# Patient Record
Sex: MALE | Race: OTHER | HISPANIC OR LATINO
[De-identification: names, ages, dates, MRNs, and addresses within clinical notes are randomized per-mention and may not be internally consistent; named-entity substitution may affect disease eponyms.]

---

## 2017-01-10 ENCOUNTER — RECORD ABSTRACTING (OUTPATIENT)
Age: 47
End: 2017-01-10

## 2017-01-10 DIAGNOSIS — Z80.6 FAMILY HISTORY OF LEUKEMIA: ICD-10-CM

## 2017-01-10 DIAGNOSIS — Z87.898 PERSONAL HISTORY OF OTHER SPECIFIED CONDITIONS: ICD-10-CM

## 2017-01-10 DIAGNOSIS — Z92.89 PERSONAL HISTORY OF OTHER MEDICAL TREATMENT: ICD-10-CM

## 2017-01-10 DIAGNOSIS — Z78.9 OTHER SPECIFIED HEALTH STATUS: ICD-10-CM

## 2017-01-10 DIAGNOSIS — I86.1 SCROTAL VARICES: ICD-10-CM

## 2017-03-25 ENCOUNTER — RECORD ABSTRACTING (OUTPATIENT)
Age: 47
End: 2017-03-25

## 2017-03-29 ENCOUNTER — APPOINTMENT (OUTPATIENT)
Dept: INTERNAL MEDICINE | Facility: HOSPITAL | Age: 47
End: 2017-03-29

## 2017-04-19 DIAGNOSIS — N43.3 HYDROCELE, UNSPECIFIED: ICD-10-CM

## 2017-04-19 DIAGNOSIS — Z87.19 PERSONAL HISTORY OF OTHER DISEASES OF THE DIGESTIVE SYSTEM: ICD-10-CM

## 2017-05-02 ENCOUNTER — APPOINTMENT (OUTPATIENT)
Dept: INTERNAL MEDICINE | Facility: HOSPITAL | Age: 47
End: 2017-05-02

## 2017-05-02 VITALS
BODY MASS INDEX: 27.48 KG/M2 | TEMPERATURE: 99.4 F | SYSTOLIC BLOOD PRESSURE: 110 MMHG | DIASTOLIC BLOOD PRESSURE: 80 MMHG | HEART RATE: 64 BPM | WEIGHT: 171 LBS | HEIGHT: 66 IN

## 2017-05-02 LAB
ALBUMIN SERPL-MCNC: 4.8 G/DL
ALBUMIN/GLOB SERPL: 2.67
ALP SERPL-CCNC: 74 IU/L
ALT SERPL-CCNC: 24 IU/L
ANION GAP SERPL CALC-SCNC: 8 MMOL/L
AST SERPL-CCNC: 19 IU/L
BASOPHILS # BLD: 0.01 TH/MM3
BASOPHILS NFR BLD: 0.2 %
BILIRUB SERPL-MCNC: 0.6 MG/DL
BUN SERPL-MCNC: 19 MG/DL
BUN/CREAT SERPL: 25.7 %
CALCIUM SERPL-MCNC: 9.3 MG/DL
CHLORIDE SERPL-SCNC: 109 MMOL/L
CHOLEST SERPL-MCNC: 240 MG/DL
CO2 SERPL-SCNC: 24 MMOL/L
CREAT SERPL-MCNC: 0.74 MG/DL
EOSINOPHIL # BLD: 0.04 TH/MM3
EOSINOPHIL NFR BLD: 0.6 %
ERYTHROCYTE [DISTWIDTH] IN BLOOD BY AUTOMATED COUNT: 12.5 %
GFR SERPL CREATININE-BSD FRML MDRD: 113
GLUCOSE SERPL-MCNC: 96 MG/DL
GRANULOCYTES # BLD: 3.93 TH/MM3
GRANULOCYTES NFR BLD: 60.7 %
HCT VFR BLD AUTO: 41.7 %
HDLC SERPL-MCNC: 52 MG/DL
HDLC SERPL: 4.6
HGB BLD-MCNC: 14.8 G/DL
IMM GRANULOCYTES # BLD: 0.01 TH/MM3
IMM GRANULOCYTES NFR BLD: 0.2 %
LDLC SERPL DIRECT ASSAY-MCNC: 157 MG/DL
LYMPHOCYTES # BLD: 1.98 TH/MM3
LYMPHOCYTES NFR BLD: 30.7 %
MCH RBC QN AUTO: 29.6 PG
MCHC RBC AUTO-ENTMCNC: 35.5 G/DL
MCV RBC AUTO: 83.4 FL
MONOCYTES # BLD: 0.49 TH/MM3
MONOCYTES NFR BLD: 7.6 %
PLATELET # BLD: 173 TH/MM3
PMV BLD AUTO: 12.4 FL
POTASSIUM SERPL-SCNC: 3.7 MMOL/L
PROT SERPL-MCNC: 6.6 G/DL
RBC # BLD AUTO: 5 ML/MM3
SODIUM SERPL-SCNC: 141 MMOL/L
TRIGL SERPL-MCNC: 108 MG/DL
VLDLC SERPL-MCNC: 21 MG/DL
WBC # BLD: 6.46 TH/MM3

## 2017-05-03 LAB
HCV AB S/CO SERPL IA: 0.1 S/CO
HCV AB SER QL: NONREACTIVE

## 2017-06-13 ENCOUNTER — OUTPATIENT (OUTPATIENT)
Dept: OUTPATIENT SERVICES | Facility: HOSPITAL | Age: 47
LOS: 1 days | Discharge: HOME | End: 2017-06-13

## 2017-06-13 ENCOUNTER — APPOINTMENT (OUTPATIENT)
Dept: INTERNAL MEDICINE | Facility: HOSPITAL | Age: 47
End: 2017-06-13

## 2017-06-13 VITALS
TEMPERATURE: 99 F | SYSTOLIC BLOOD PRESSURE: 108 MMHG | WEIGHT: 172 LBS | DIASTOLIC BLOOD PRESSURE: 82 MMHG | HEART RATE: 64 BPM | HEIGHT: 66 IN | BODY MASS INDEX: 27.64 KG/M2

## 2017-06-28 DIAGNOSIS — E78.5 HYPERLIPIDEMIA, UNSPECIFIED: ICD-10-CM

## 2017-06-28 DIAGNOSIS — R07.89 OTHER CHEST PAIN: ICD-10-CM

## 2017-08-02 ENCOUNTER — OUTPATIENT (OUTPATIENT)
Dept: OUTPATIENT SERVICES | Facility: HOSPITAL | Age: 47
LOS: 1 days | Discharge: HOME | End: 2017-08-02

## 2017-08-02 DIAGNOSIS — E78.5 HYPERLIPIDEMIA, UNSPECIFIED: ICD-10-CM

## 2017-08-02 DIAGNOSIS — R07.89 OTHER CHEST PAIN: ICD-10-CM

## 2019-02-26 ENCOUNTER — APPOINTMENT (OUTPATIENT)
Dept: INTERNAL MEDICINE | Facility: HOSPITAL | Age: 49
End: 2019-02-26

## 2019-02-26 ENCOUNTER — OUTPATIENT (OUTPATIENT)
Dept: OUTPATIENT SERVICES | Facility: HOSPITAL | Age: 49
LOS: 1 days | Discharge: HOME | End: 2019-02-26

## 2019-02-26 VITALS
SYSTOLIC BLOOD PRESSURE: 122 MMHG | HEIGHT: 66 IN | TEMPERATURE: 98 F | HEART RATE: 72 BPM | DIASTOLIC BLOOD PRESSURE: 80 MMHG | BODY MASS INDEX: 28.12 KG/M2 | WEIGHT: 175 LBS | RESPIRATION RATE: 16 BRPM

## 2019-02-26 RX ORDER — CYCLOBENZAPRINE HYDROCHLORIDE 5 MG/1
5 TABLET, FILM COATED ORAL
Qty: 10 | Refills: 0 | Status: COMPLETED | COMMUNITY
Start: 2019-02-26 | End: 2019-03-08

## 2019-02-26 NOTE — ASSESSMENT
[FreeTextEntry1] : 49 yo presents with low back pain and bilateral hip pain of 1 week duration. He states 8/10 pain in left side of hip, describes as pinching sensation. Also reports intermittent atypical chest pain.\par \par #Low back pain / Bilateral hip pain\par - xray LS- spine\par - NSAIDs prn\par - muscle relaxant (cyclobenzaprine) prn for 10 days\par \par #Atypical chest pain\par recommend to have ECG/ treadmill stress test: ordered\par \par #HCM\par a.  pt. denies any suicidal/homicidal ideation.  Pt. declines psych referral\par \par #HCM\par repeat lipid profile/cmp/cbc/HbA1c/vitamin D\par denied flu vaccine\par colonoscopy referral given\par f/u in 1month

## 2019-02-26 NOTE — REVIEW OF SYSTEMS
[Negative] : Psychiatric [Fever] : no fever [Chills] : no chills [Night Sweats] : no night sweats [Recent Change In Weight] : ~T no recent weight change [FreeTextEntry5] : see hpi [FreeTextEntry9] : see hpi

## 2019-02-26 NOTE — HISTORY OF PRESENT ILLNESS
[FreeTextEntry1] : Pt. is a 49yo male, complaint of 1 week h/o b/l hip pain radiated to the groin area, no bulging at the groin area.  Pain improved with motrin [de-identified] : 47 yo presents with low back pain and bilateral hip pain of 1 week duration. He states 8/10 pain in left side of hip, describes as pinching sensation. Denies any recent trauma or injury but reports  lifting heavy objects. Pt reports improvement of pain with NSAIDs and is able to ambulate. denies any weakness/numbness/tingling sensation in LE. Also reports atypical chest pain on and off, last episode 1 month ago.. \par Denies any other symptoms s/a fever/chills/nausea/vomiting/abd pain/urinary symptoms/focal weaknesses. ROS otherwise negative

## 2019-02-26 NOTE — PHYSICAL EXAM
[No Acute Distress] : no acute distress [Well Nourished] : well nourished [No Respiratory Distress] : no respiratory distress  [Clear to Auscultation] : lungs were clear to auscultation bilaterally [No Accessory Muscle Use] : no accessory muscle use [Normal Rate] : normal rate  [Regular Rhythm] : with a regular rhythm [Normal S1, S2] : normal S1 and S2 [No Joint Swelling] : no joint swelling [de-identified] : denied examinationfor hernia [de-identified] : paraspinal tenderness. no restricted range of motion.

## 2019-02-27 DIAGNOSIS — R07.89 OTHER CHEST PAIN: ICD-10-CM

## 2019-02-27 DIAGNOSIS — M54.5 LOW BACK PAIN: ICD-10-CM

## 2019-03-04 ENCOUNTER — FORM ENCOUNTER (OUTPATIENT)
Age: 49
End: 2019-03-04

## 2019-03-18 LAB
BASOPHILS # BLD AUTO: 0.02 K/UL
BASOPHILS NFR BLD AUTO: 0.3 %
EOSINOPHIL # BLD AUTO: 0.05 K/UL
EOSINOPHIL NFR BLD AUTO: 0.8 %
HBA1C MFR BLD HPLC: 5.4 %
HCT VFR BLD CALC: 43.7 %
HGB BLD-MCNC: 14.5 G/DL
IMM GRANULOCYTES NFR BLD AUTO: 0.2 %
LYMPHOCYTES # BLD AUTO: 1.69 K/UL
LYMPHOCYTES NFR BLD AUTO: 27.8 %
MAN DIFF?: NORMAL
MCHC RBC-ENTMCNC: 29.1 PG
MCHC RBC-ENTMCNC: 33.2 G/DL
MCV RBC AUTO: 87.8 FL
MONOCYTES # BLD AUTO: 0.43 K/UL
MONOCYTES NFR BLD AUTO: 7.1 %
NEUTROPHILS # BLD AUTO: 3.87 K/UL
NEUTROPHILS NFR BLD AUTO: 63.8 %
PLATELET # BLD AUTO: 179 K/UL
RBC # BLD: 4.98 M/UL
RBC # FLD: 12.2 %
WBC # FLD AUTO: 6.07 K/UL

## 2019-03-19 LAB
25(OH)D3 SERPL-MCNC: 20 NG/ML
ALBUMIN SERPL ELPH-MCNC: 4.6 G/DL
ALP BLD-CCNC: 113 U/L
ALT SERPL-CCNC: 22 U/L
ANION GAP SERPL CALC-SCNC: 15 MMOL/L
AST SERPL-CCNC: 15 U/L
BILIRUB SERPL-MCNC: 0.3 MG/DL
BUN SERPL-MCNC: 19 MG/DL
CALCIUM SERPL-MCNC: 9 MG/DL
CHLORIDE SERPL-SCNC: 102 MMOL/L
CHOLEST SERPL-MCNC: 211 MG/DL
CHOLEST/HDLC SERPL: 3.8 RATIO
CO2 SERPL-SCNC: 21 MMOL/L
CREAT SERPL-MCNC: 1 MG/DL
GLUCOSE SERPL-MCNC: 91 MG/DL
HDLC SERPL-MCNC: 55 MG/DL
LDLC SERPL CALC-MCNC: 145 MG/DL
POTASSIUM SERPL-SCNC: 4 MMOL/L
PROT SERPL-MCNC: 6.9 G/DL
SODIUM SERPL-SCNC: 138 MMOL/L
TRIGL SERPL-MCNC: 98 MG/DL

## 2019-04-04 ENCOUNTER — APPOINTMENT (OUTPATIENT)
Dept: INTERNAL MEDICINE | Facility: HOSPITAL | Age: 49
End: 2019-04-04

## 2019-04-04 ENCOUNTER — OUTPATIENT (OUTPATIENT)
Dept: OUTPATIENT SERVICES | Facility: HOSPITAL | Age: 49
LOS: 1 days | Discharge: HOME | End: 2019-04-04

## 2019-04-04 VITALS
TEMPERATURE: 99 F | HEART RATE: 72 BPM | RESPIRATION RATE: 16 BRPM | WEIGHT: 175 LBS | SYSTOLIC BLOOD PRESSURE: 122 MMHG | DIASTOLIC BLOOD PRESSURE: 76 MMHG | BODY MASS INDEX: 28.12 KG/M2 | HEIGHT: 66 IN

## 2019-04-04 NOTE — ASSESSMENT
[FreeTextEntry1] : 01.  Low back pain/hip pain: resolved.  Xray of LS spine shows DJD L4-L5, L5-S1\par a.  observe\par b.  no heavy lifting\par c.  muscle strengthening exercise\par 02.  Atypical chest pain\par a.  follow exercise stress test as scheduled\par 03.  Hyperlipidemia/overweight: , triglyceride 98\par a.  restarted pravastatin 40mg at bedtime\par b.  1500 calories low fat/chol. high fiber diet\par c.  follow up visit in 3 months\par d.  weight loss, exercise as tolerated\par 04.  Vitamin D insufficiency\par a.  OTC Vitamin D3 2000 unit daily\par 05.  HCM\par a.  follow GI appointment 4/12/19

## 2019-04-04 NOTE — COUNSELING
[Weight management counseling provided] : Weight management [Healthy eating counseling provided] : healthy eating [Activity counseling provided] : activity [Low Fat Diet] : Low fat diet [Low Salt Diet] : Low salt diet [Decrease Portions] : Decrease food portions [Walking] : Walking [de-identified] : 1500 calories low fat/chol. high fiber diet

## 2019-04-04 NOTE — HISTORY OF PRESENT ILLNESS
[FreeTextEntry1] : Pt. is a 47yo male, here for follow up back pain, b/l hip pain and chest pain.  Pt. states back pain/hip pain resolved.  Pt. has appointment scheduled 4/14/19 for exercise stress test and appointment for GI 4/12/19.  Pt. denies other complaint, no chest pain at this time, no shortness of breath, no n/v/diarrhea, no headache, no palpitation

## 2019-04-08 DIAGNOSIS — E66.3 OVERWEIGHT: ICD-10-CM

## 2019-04-08 DIAGNOSIS — R07.89 OTHER CHEST PAIN: ICD-10-CM

## 2019-04-08 DIAGNOSIS — E78.5 HYPERLIPIDEMIA, UNSPECIFIED: ICD-10-CM

## 2019-04-08 DIAGNOSIS — E55.9 VITAMIN D DEFICIENCY, UNSPECIFIED: ICD-10-CM

## 2019-04-08 DIAGNOSIS — M54.5 LOW BACK PAIN: ICD-10-CM

## 2019-04-09 ENCOUNTER — OUTPATIENT (OUTPATIENT)
Dept: OUTPATIENT SERVICES | Facility: HOSPITAL | Age: 49
LOS: 1 days | Discharge: HOME | End: 2019-04-09
Payer: SUBSIDIZED

## 2019-04-09 DIAGNOSIS — R07.89 OTHER CHEST PAIN: ICD-10-CM

## 2019-04-09 PROCEDURE — 93016 CV STRESS TEST SUPVJ ONLY: CPT

## 2019-04-09 PROCEDURE — 93018 CV STRESS TEST I&R ONLY: CPT

## 2019-04-26 ENCOUNTER — APPOINTMENT (OUTPATIENT)
Dept: GASTROENTEROLOGY | Facility: CLINIC | Age: 49
End: 2019-04-26

## 2019-04-26 ENCOUNTER — OUTPATIENT (OUTPATIENT)
Dept: OUTPATIENT SERVICES | Facility: HOSPITAL | Age: 49
LOS: 1 days | Discharge: HOME | End: 2019-04-26

## 2019-04-26 VITALS
HEIGHT: 66 IN | DIASTOLIC BLOOD PRESSURE: 82 MMHG | SYSTOLIC BLOOD PRESSURE: 123 MMHG | WEIGHT: 175 LBS | HEART RATE: 55 BPM | BODY MASS INDEX: 28.12 KG/M2

## 2019-04-26 NOTE — ASSESSMENT
[FreeTextEntry1] : 49 yo  M average risk CRC.\par Atypical CP\par \par 1)Average risk CRC\par 2)Atypical CP SP stress test- read as normal however ?target HR not reached due to fatigue\par 3)GERD\par \par Rec:\par - Cardiac referral once cardiac work up done then would recommend to come back to schedule EGD and colonoscopy

## 2019-04-26 NOTE — END OF VISIT
[FreeTextEntry3] : Pt seen and examined with Dr Feroz Godinez.  Pt has chest pain and has not seen cardiology.  We will refer to cardiology for further evaluation and treatment as needed.  Clearance will also be needed for egd and colonoscopy for GERD and CRC screening.  Pt will follow up with us in two months after cardiology assessment.

## 2019-04-26 NOTE — PHYSICAL EXAM
[General Appearance - Alert] : alert [General Appearance - In No Acute Distress] : in no acute distress [Sclera] : the sclera and conjunctiva were normal [Outer Ear] : the ears and nose were normal in appearance [Neck Appearance] : the appearance of the neck was normal [Apical Impulse] : the apical impulse was normal [] : no respiratory distress [Breast Appearance] : normal in appearance [Bowel Sounds] : normal bowel sounds [Abdomen Soft] : soft [Abnormal Walk] : normal gait [Skin Color & Pigmentation] : normal skin color and pigmentation

## 2019-04-26 NOTE — HISTORY OF PRESENT ILLNESS
[FreeTextEntry1] : Pt. is a 49yo male, here for follow up back pain, b/l hip pain and chest pain. here for CRC screening\par Denies hematochezia/melena, hematemesis, weight loss.\par Endorses GERD symptoms 2-3 days per week only with tomatoes containing food, greasy food and coffee. No dysphagia\par FMhx: negative for CRC\par Labs reviewed no anemia

## 2019-06-10 ENCOUNTER — APPOINTMENT (OUTPATIENT)
Dept: CARDIOLOGY | Facility: CLINIC | Age: 49
End: 2019-06-10
Payer: COMMERCIAL

## 2019-06-10 ENCOUNTER — OUTPATIENT (OUTPATIENT)
Dept: OUTPATIENT SERVICES | Facility: HOSPITAL | Age: 49
LOS: 1 days | Discharge: HOME | End: 2019-06-10

## 2019-06-10 VITALS
TEMPERATURE: 98.2 F | HEART RATE: 58 BPM | DIASTOLIC BLOOD PRESSURE: 89 MMHG | WEIGHT: 176 LBS | HEIGHT: 66 IN | SYSTOLIC BLOOD PRESSURE: 138 MMHG | BODY MASS INDEX: 28.28 KG/M2

## 2019-06-10 DIAGNOSIS — R07.89 OTHER CHEST PAIN: ICD-10-CM

## 2019-06-10 PROCEDURE — 99203 OFFICE O/P NEW LOW 30 MIN: CPT

## 2019-06-11 PROBLEM — R07.89 ATYPICAL CHEST PAIN: Status: RESOLVED | Noted: 2017-05-02 | Resolved: 2019-06-11

## 2019-06-11 NOTE — DISCUSSION/SUMMARY
[FreeTextEntry1] : No further cardiac testing required at present.\par Monitor BP.\par Regular PMD follow-up / labs (has appointment 7/2019).\par GI follow-up.\par Follow-up 1-y or prn.

## 2019-06-11 NOTE — ASSESSMENT
[FreeTextEntry1] : Chest pain not anginal.\par Likely musculoskeletal.\par Good functional capacity.  Reassuring EST (4/2019).\par \par BP mildly elevated.

## 2019-06-11 NOTE — HISTORY OF PRESENT ILLNESS
[FreeTextEntry1] : 48 year-old male referred for chest pain.\par \par No cardiac history.\par Risk factors include HLD.\par \par Onset of chest pain few months ago.  Intermittent, but can last few days.  Upper left chest / shoulder.  Random / not exertional.  Variably exacerbated by left arm motion / palpation.  No associated symptoms.  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  Breathing comfortable. No palpitations, lightheadedness, syncope.\par \par Statin restarted after below labs.\par \par Being evaluated for GERD by GI.\par \par EST (4/9/19): 12:02 (86%).  No CP / arrhythmias.  nL EKG response.\par \par Labs reviewed (PMD - 3/15/19):\par   HDL 55  TRI 98\par CBC / CMP / HbA1c unremarkable.

## 2019-07-11 ENCOUNTER — APPOINTMENT (OUTPATIENT)
Dept: INTERNAL MEDICINE | Facility: HOSPITAL | Age: 49
End: 2019-07-11

## 2019-07-11 ENCOUNTER — OUTPATIENT (OUTPATIENT)
Dept: OUTPATIENT SERVICES | Facility: HOSPITAL | Age: 49
LOS: 1 days | Discharge: HOME | End: 2019-07-11

## 2019-07-11 VITALS
TEMPERATURE: 97.4 F | BODY MASS INDEX: 27.97 KG/M2 | DIASTOLIC BLOOD PRESSURE: 82 MMHG | RESPIRATION RATE: 10 BRPM | HEART RATE: 74 BPM | HEIGHT: 66 IN | SYSTOLIC BLOOD PRESSURE: 132 MMHG | WEIGHT: 174 LBS

## 2019-07-11 DIAGNOSIS — M54.5 LOW BACK PAIN: ICD-10-CM

## 2019-07-11 DIAGNOSIS — R07.9 CHEST PAIN, UNSPECIFIED: ICD-10-CM

## 2019-07-11 NOTE — HISTORY OF PRESENT ILLNESS
[FreeTextEntry1] : Pt. is a 47yo male, here for follow up chest pain, and hyperlipidemia.  Pt. seen cardiology, chest pain not anginal, likely musculoskeletal.  Pt. has negative exercise stress test.  Pt. also complaint of 1 month left lower back pain, no radiation of pain, no trauma/injury to area, no tingling/numbness/weakness of extremities

## 2019-07-11 NOTE — PHYSICAL EXAM
[No Respiratory Distress] : no respiratory distress  [No Accessory Muscle Use] : no accessory muscle use [Clear to Auscultation] : lungs were clear to auscultation bilaterally [Normal Rate] : normal rate  [Regular Rhythm] : with a regular rhythm [Normal S1, S2] : normal S1 and S2 [No Carotid Bruits] : no carotid bruits [No Abdominal Bruit] : a ~M bruit was not heard ~T in the abdomen [Pedal Pulses Present] : the pedal pulses are present [No Palpable Aorta] : no palpable aorta [No Edema] : there was no peripheral edema [Soft] : abdomen soft [No Extremity Clubbing/Cyanosis] : no extremity clubbing/cyanosis [Non-distended] : non-distended [Non Tender] : non-tender [No Masses] : no abdominal mass palpated [No HSM] : no HSM [Normal Bowel Sounds] : normal bowel sounds [No CVA Tenderness] : no CVA  tenderness [No Joint Swelling] : no joint swelling [Grossly Normal Strength/Tone] : grossly normal strength/tone [de-identified] : mild tenderness on palpation of left sacral iliac joint, no rash

## 2019-07-15 DIAGNOSIS — E55.9 VITAMIN D DEFICIENCY, UNSPECIFIED: ICD-10-CM

## 2019-07-15 DIAGNOSIS — R07.9 CHEST PAIN, UNSPECIFIED: ICD-10-CM

## 2019-07-15 DIAGNOSIS — E66.3 OVERWEIGHT: ICD-10-CM

## 2019-07-15 DIAGNOSIS — M54.5 LOW BACK PAIN: ICD-10-CM

## 2019-07-15 DIAGNOSIS — E78.5 HYPERLIPIDEMIA, UNSPECIFIED: ICD-10-CM

## 2019-12-10 ENCOUNTER — OUTPATIENT (OUTPATIENT)
Dept: OUTPATIENT SERVICES | Facility: HOSPITAL | Age: 49
LOS: 1 days | Discharge: HOME | End: 2019-12-10

## 2019-12-10 ENCOUNTER — APPOINTMENT (OUTPATIENT)
Dept: INTERNAL MEDICINE | Facility: HOSPITAL | Age: 49
End: 2019-12-10
Payer: COMMERCIAL

## 2019-12-10 VITALS
HEART RATE: 64 BPM | SYSTOLIC BLOOD PRESSURE: 120 MMHG | DIASTOLIC BLOOD PRESSURE: 76 MMHG | WEIGHT: 178 LBS | TEMPERATURE: 98.5 F | HEIGHT: 66 IN | BODY MASS INDEX: 28.61 KG/M2 | RESPIRATION RATE: 14 BRPM

## 2019-12-10 PROCEDURE — 99213 OFFICE O/P EST LOW 20 MIN: CPT | Mod: GC

## 2019-12-10 NOTE — HISTORY OF PRESENT ILLNESS
[FreeTextEntry1] : Pt. is a 49yo male, here for follow up hyperlipidemia.  Pt. states he has had 2 months h/o pain in his rectum, denies any BRBPR, no rectal itch, no fever/chill

## 2019-12-10 NOTE — PHYSICAL EXAM
[No Respiratory Distress] : no respiratory distress  [No Accessory Muscle Use] : no accessory muscle use [Regular Rhythm] : with a regular rhythm [Normal Rate] : normal rate  [Clear to Auscultation] : lungs were clear to auscultation bilaterally [Normal S1, S2] : normal S1 and S2 [No Varicosities] : no varicosities [No Abdominal Bruit] : a ~M bruit was not heard ~T in the abdomen [No Carotid Bruits] : no carotid bruits [Pedal Pulses Present] : the pedal pulses are present [No Edema] : there was no peripheral edema [Soft] : abdomen soft [No Palpable Aorta] : no palpable aorta [No Extremity Clubbing/Cyanosis] : no extremity clubbing/cyanosis [Non Tender] : non-tender [Non-distended] : non-distended [No Masses] : no abdominal mass palpated [No HSM] : no HSM [No Joint Swelling] : no joint swelling [Normal Bowel Sounds] : normal bowel sounds [Grossly Normal Strength/Tone] : grossly normal strength/tone [de-identified] : dime size cystic lesion anterior to anal area, non tenderness

## 2019-12-11 DIAGNOSIS — E55.9 VITAMIN D DEFICIENCY, UNSPECIFIED: ICD-10-CM

## 2019-12-11 DIAGNOSIS — E78.5 HYPERLIPIDEMIA, UNSPECIFIED: ICD-10-CM

## 2019-12-11 DIAGNOSIS — E66.3 OVERWEIGHT: ICD-10-CM

## 2020-02-06 LAB
ALBUMIN SERPL ELPH-MCNC: 4.8 G/DL
ALP BLD-CCNC: 92 U/L
ALT SERPL-CCNC: 27 U/L
ANION GAP SERPL CALC-SCNC: 13 MMOL/L
AST SERPL-CCNC: 16 U/L
BASOPHILS # BLD AUTO: 0.02 K/UL
BASOPHILS NFR BLD AUTO: 0.4 %
BILIRUB SERPL-MCNC: 0.4 MG/DL
BUN SERPL-MCNC: 17 MG/DL
CALCIUM SERPL-MCNC: 9.2 MG/DL
CHLORIDE SERPL-SCNC: 104 MMOL/L
CHOLEST SERPL-MCNC: 198 MG/DL
CHOLEST/HDLC SERPL: 3.7 RATIO
CO2 SERPL-SCNC: 25 MMOL/L
CREAT SERPL-MCNC: 1 MG/DL
EOSINOPHIL # BLD AUTO: 0.08 K/UL
EOSINOPHIL NFR BLD AUTO: 1.5 %
GLUCOSE SERPL-MCNC: 92 MG/DL
HCT VFR BLD CALC: 45.2 %
HDLC SERPL-MCNC: 54 MG/DL
HGB BLD-MCNC: 14.9 G/DL
IMM GRANULOCYTES NFR BLD AUTO: 0.4 %
LDLC SERPL CALC-MCNC: 139 MG/DL
LYMPHOCYTES # BLD AUTO: 1.89 K/UL
LYMPHOCYTES NFR BLD AUTO: 36.2 %
MAN DIFF?: NORMAL
MCHC RBC-ENTMCNC: 29.6 PG
MCHC RBC-ENTMCNC: 33 G/DL
MCV RBC AUTO: 89.7 FL
MONOCYTES # BLD AUTO: 0.32 K/UL
MONOCYTES NFR BLD AUTO: 6.1 %
NEUTROPHILS # BLD AUTO: 2.89 K/UL
NEUTROPHILS NFR BLD AUTO: 55.4 %
PLATELET # BLD AUTO: 182 K/UL
POTASSIUM SERPL-SCNC: 4.4 MMOL/L
PROT SERPL-MCNC: 7.1 G/DL
RBC # BLD: 5.04 M/UL
RBC # FLD: 12.3 %
SODIUM SERPL-SCNC: 142 MMOL/L
TRIGL SERPL-MCNC: 73 MG/DL
WBC # FLD AUTO: 5.22 K/UL

## 2020-02-07 LAB — 25(OH)D3 SERPL-MCNC: 20 NG/ML

## 2020-03-13 ENCOUNTER — OUTPATIENT (OUTPATIENT)
Dept: OUTPATIENT SERVICES | Facility: HOSPITAL | Age: 50
LOS: 1 days | Discharge: HOME | End: 2020-03-13

## 2020-03-13 ENCOUNTER — APPOINTMENT (OUTPATIENT)
Dept: GASTROENTEROLOGY | Facility: CLINIC | Age: 50
End: 2020-03-13
Payer: COMMERCIAL

## 2020-03-13 VITALS
HEART RATE: 54 BPM | BODY MASS INDEX: 28.77 KG/M2 | SYSTOLIC BLOOD PRESSURE: 132 MMHG | WEIGHT: 179 LBS | HEIGHT: 66 IN | DIASTOLIC BLOOD PRESSURE: 84 MMHG | TEMPERATURE: 97.9 F

## 2020-03-13 DIAGNOSIS — K92.1 MELENA: ICD-10-CM

## 2020-03-13 DIAGNOSIS — Z12.11 ENCOUNTER FOR SCREENING FOR MALIGNANT NEOPLASM OF COLON: ICD-10-CM

## 2020-03-13 PROCEDURE — 99204 OFFICE O/P NEW MOD 45 MIN: CPT

## 2020-03-13 NOTE — PHYSICAL EXAM
[General Appearance - Alert] : alert [General Appearance - In No Acute Distress] : in no acute distress [General Appearance - Well Nourished] : well nourished [General Appearance - Well Developed] : well developed [Sclera] : the sclera and conjunctiva were normal [Outer Ear] : the ears and nose were normal in appearance [Neck Appearance] : the appearance of the neck was normal [Respiration, Rhythm And Depth] : normal respiratory rhythm and effort [Exaggerated Use Of Accessory Muscles For Inspiration] : no accessory muscle use [Auscultation Breath Sounds / Voice Sounds] : lungs were clear to auscultation bilaterally [Apical Impulse] : the apical impulse was normal [Heart Sounds] : normal S1 and S2 [Bowel Sounds] : normal bowel sounds [Abdomen Soft] : soft [Abdomen Tenderness] : non-tender [Abdomen Mass (___ Cm)] : no abdominal mass palpated [Abdomen Hernia] : no hernia was discovered [No CVA Tenderness] : no ~M costovertebral angle tenderness [Abnormal Walk] : normal gait [Skin Color & Pigmentation] : normal skin color and pigmentation [] : no rash [Oriented To Time, Place, And Person] : oriented to person, place, and time

## 2020-03-13 NOTE — ASSESSMENT
[FreeTextEntry1] : 49 year  male, here for evaluation of chronic GERD  and for screening colonoscopy. Patient was seen before for same presentation , but he had chest pain for which he was sent to cardiology for clearance . Patient was cleared by cardiology\par \par #chronic GERD: more than 8 years ago \par no alarm symptoms \par no anemia \par REC: \par EGD \par Protonix 40 mg OD \par \par #history of hematochezia / above 50 years \par hematochezia resolved now\par REC: \par diagnostic colonoscopy\par \par

## 2020-03-13 NOTE — HISTORY OF PRESENT ILLNESS
[de-identified] : Pt. is a 49 year  male, here for evaluation of chronic GERD  and for screening colonoscopy. Patient was seen before April 2019 fore the same presentation where he was also complaining of chest pain and sent for cardiology clearance prior to EGD colon. Patient was evaluated by cardiology who diagnosed him with musculoskeletal pain and did not recommend any cardiac workup. Patient  still Endorses GERD symptoms 2-3 days per week only with tomatoes containing food, greasy food and coffee. No dysphagia , no odynophagia , weight loss , nausea , vomiting , no abdominal pain. Patient mentioned that he has daily bowel movements , but around one year ago , she saw streaks of blood on defecation , but this has resolved spontaneously. \par FMhx: negative for CRC\par Labs reviewed no anemia

## 2020-03-18 DIAGNOSIS — K21.9 GASTRO-ESOPHAGEAL REFLUX DISEASE WITHOUT ESOPHAGITIS: ICD-10-CM

## 2020-03-18 DIAGNOSIS — Z12.11 ENCOUNTER FOR SCREENING FOR MALIGNANT NEOPLASM OF COLON: ICD-10-CM

## 2020-03-18 DIAGNOSIS — K92.1 MELENA: ICD-10-CM

## 2020-06-27 ENCOUNTER — LABORATORY RESULT (OUTPATIENT)
Age: 50
End: 2020-06-27

## 2020-06-27 ENCOUNTER — OUTPATIENT (OUTPATIENT)
Dept: OUTPATIENT SERVICES | Facility: HOSPITAL | Age: 50
LOS: 1 days | Discharge: HOME | End: 2020-06-27

## 2020-06-27 DIAGNOSIS — Z11.59 ENCOUNTER FOR SCREENING FOR OTHER VIRAL DISEASES: ICD-10-CM

## 2020-06-29 ENCOUNTER — OUTPATIENT (OUTPATIENT)
Dept: OUTPATIENT SERVICES | Facility: HOSPITAL | Age: 50
LOS: 1 days | Discharge: HOME | End: 2020-06-29
Payer: SUBSIDIZED

## 2020-06-29 ENCOUNTER — RESULT REVIEW (OUTPATIENT)
Age: 50
End: 2020-06-29

## 2020-06-29 ENCOUNTER — TRANSCRIPTION ENCOUNTER (OUTPATIENT)
Age: 50
End: 2020-06-29

## 2020-06-29 VITALS
OXYGEN SATURATION: 99 % | HEART RATE: 54 BPM | RESPIRATION RATE: 20 BRPM | TEMPERATURE: 98 F | SYSTOLIC BLOOD PRESSURE: 143 MMHG | DIASTOLIC BLOOD PRESSURE: 80 MMHG

## 2020-06-29 VITALS
TEMPERATURE: 98 F | DIASTOLIC BLOOD PRESSURE: 90 MMHG | WEIGHT: 173.94 LBS | HEART RATE: 62 BPM | SYSTOLIC BLOOD PRESSURE: 141 MMHG | RESPIRATION RATE: 20 BRPM | HEIGHT: 66 IN

## 2020-06-29 DIAGNOSIS — Z12.11 ENCOUNTER FOR SCREENING FOR MALIGNANT NEOPLASM OF COLON: ICD-10-CM

## 2020-06-29 PROCEDURE — 88305 TISSUE EXAM BY PATHOLOGIST: CPT | Mod: 26

## 2020-06-29 PROCEDURE — 43239 EGD BIOPSY SINGLE/MULTIPLE: CPT | Mod: XS

## 2020-06-29 PROCEDURE — 45378 DIAGNOSTIC COLONOSCOPY: CPT

## 2020-06-29 PROCEDURE — 88312 SPECIAL STAINS GROUP 1: CPT | Mod: 26

## 2020-06-29 NOTE — H&P PST ADULT - PULMONARY EMBOLUS
Render Note In Bullet Format When Appropriate: No Detail Level: Simple Consent: The patient's consent was obtained including but not limited to risks of pain, swelling, and crusting. Duration Of Freeze Thaw-Cycle (Seconds): 0 Post-Care Instructions: Pt advised to call if not healed with normal skin in 1 month. no

## 2020-06-29 NOTE — H&P PST ADULT - HISTORY OF PRESENT ILLNESS
50 yo male with PMh listed below comes here for EGd (chronic GERD) and diagnostic colonoscopy (hematochezia)

## 2020-06-29 NOTE — H&P PST ADULT - ASSESSMENT
50 yo male with PMh listed below comes here for EGd (chronic GERD) and diagnostic colonoscopy (hematochezia)    Rec:  EGd and Colonoscopy

## 2020-07-01 LAB — SURGICAL PATHOLOGY STUDY: SIGNIFICANT CHANGE UP

## 2020-07-07 DIAGNOSIS — K64.8 OTHER HEMORRHOIDS: ICD-10-CM

## 2020-07-07 DIAGNOSIS — K29.80 DUODENITIS WITHOUT BLEEDING: ICD-10-CM

## 2020-07-07 DIAGNOSIS — K31.9 DISEASE OF STOMACH AND DUODENUM, UNSPECIFIED: ICD-10-CM

## 2020-07-07 DIAGNOSIS — K29.50 UNSPECIFIED CHRONIC GASTRITIS WITHOUT BLEEDING: ICD-10-CM

## 2020-07-07 DIAGNOSIS — E78.00 PURE HYPERCHOLESTEROLEMIA, UNSPECIFIED: ICD-10-CM

## 2020-07-07 DIAGNOSIS — K92.1 MELENA: ICD-10-CM

## 2020-07-07 DIAGNOSIS — B96.81 HELICOBACTER PYLORI [H. PYLORI] AS THE CAUSE OF DISEASES CLASSIFIED ELSEWHERE: ICD-10-CM

## 2020-08-11 PROBLEM — E78.00 PURE HYPERCHOLESTEROLEMIA, UNSPECIFIED: Chronic | Status: ACTIVE | Noted: 2020-06-29

## 2020-08-20 ENCOUNTER — APPOINTMENT (OUTPATIENT)
Dept: INTERNAL MEDICINE | Facility: CLINIC | Age: 50
End: 2020-08-20
Payer: COMMERCIAL

## 2020-08-20 ENCOUNTER — OUTPATIENT (OUTPATIENT)
Dept: OUTPATIENT SERVICES | Facility: HOSPITAL | Age: 50
LOS: 1 days | Discharge: HOME | End: 2020-08-20

## 2020-08-20 VITALS
TEMPERATURE: 97.8 F | HEIGHT: 66 IN | BODY MASS INDEX: 29.09 KG/M2 | SYSTOLIC BLOOD PRESSURE: 127 MMHG | WEIGHT: 181 LBS | HEART RATE: 59 BPM | DIASTOLIC BLOOD PRESSURE: 85 MMHG

## 2020-08-20 DIAGNOSIS — E55.9 VITAMIN D DEFICIENCY, UNSPECIFIED: ICD-10-CM

## 2020-08-20 DIAGNOSIS — K21.9 GASTRO-ESOPHAGEAL REFLUX DISEASE WITHOUT ESOPHAGITIS: ICD-10-CM

## 2020-08-20 DIAGNOSIS — E78.5 HYPERLIPIDEMIA, UNSPECIFIED: ICD-10-CM

## 2020-08-20 DIAGNOSIS — K29.70 GASTRITIS, UNSPECIFIED, WITHOUT BLEEDING: ICD-10-CM

## 2020-08-20 PROCEDURE — 99214 OFFICE O/P EST MOD 30 MIN: CPT | Mod: GC

## 2020-08-20 RX ORDER — PRAVASTATIN SODIUM 40 MG/1
40 TABLET ORAL
Qty: 30 | Refills: 2 | Status: DISCONTINUED | COMMUNITY
Start: 2017-06-13 | End: 2020-08-20

## 2020-08-20 RX ORDER — PANTOPRAZOLE 40 MG/1
40 TABLET, DELAYED RELEASE ORAL DAILY
Qty: 30 | Refills: 1 | Status: DISCONTINUED | COMMUNITY
Start: 2020-03-13 | End: 2020-08-20

## 2020-08-20 RX ORDER — BISMUTH SUBSALICYLATE 262MG/15ML
262 SUSPENSION, ORAL (FINAL DOSE FORM) ORAL
Qty: 56 | Refills: 0 | Status: COMPLETED | COMMUNITY
Start: 2020-08-20 | End: 2020-09-03

## 2020-08-20 RX ORDER — POLYETHYLENE GLYCOL 3350 AND ELECTROLYTES WITH LEMON FLAVOR 236; 22.74; 6.74; 5.86; 2.97 G/4L; G/4L; G/4L; G/4L; G/4L
236 POWDER, FOR SOLUTION ORAL
Qty: 1 | Refills: 0 | Status: DISCONTINUED | COMMUNITY
Start: 2020-03-13 | End: 2020-08-20

## 2020-08-20 RX ORDER — METRONIDAZOLE 500 MG/1
500 TABLET ORAL 3 TIMES DAILY
Qty: 42 | Refills: 0 | Status: COMPLETED | COMMUNITY
Start: 2020-08-20 | End: 2020-09-03

## 2020-08-20 RX ORDER — PANTOPRAZOLE 40 MG/1
40 TABLET, DELAYED RELEASE ORAL
Qty: 28 | Refills: 0 | Status: COMPLETED | COMMUNITY
Start: 2020-08-20 | End: 2020-09-03

## 2020-08-20 RX ORDER — TETRACYCLINE HYDROCHLORIDE 500 MG/1
500 CAPSULE ORAL EVERY 6 HOURS
Qty: 56 | Refills: 0 | Status: COMPLETED | COMMUNITY
Start: 2020-08-20 | End: 2020-09-03

## 2020-08-20 NOTE — HISTORY OF PRESENT ILLNESS
[FreeTextEntry1] : annual and labs check  [de-identified] : 49 yr male with DLD, GERD, H. Pylori Gastritis and Hx of LGIB  here for follow up.\par pt has been doing fine with no complaints,\par he is s/p EGD and C-scope on June 2020,  \par he has been off PPI and pravastatin, \par last labs on Feb 2020

## 2020-08-20 NOTE — PHYSICAL EXAM
[No Respiratory Distress] : no respiratory distress  [No Accessory Muscle Use] : no accessory muscle use [Regular Rhythm] : with a regular rhythm [Normal Rate] : normal rate  [Clear to Auscultation] : lungs were clear to auscultation bilaterally [Normal S1, S2] : normal S1 and S2 [No Carotid Bruits] : no carotid bruits [No Varicosities] : no varicosities [No Abdominal Bruit] : a ~M bruit was not heard ~T in the abdomen [Pedal Pulses Present] : the pedal pulses are present [No Edema] : there was no peripheral edema [No Extremity Clubbing/Cyanosis] : no extremity clubbing/cyanosis [No Palpable Aorta] : no palpable aorta [Soft] : abdomen soft [Non Tender] : non-tender [Non-distended] : non-distended [No Masses] : no abdominal mass palpated [No HSM] : no HSM [Normal Bowel Sounds] : normal bowel sounds [No Joint Swelling] : no joint swelling [Grossly Normal Strength/Tone] : grossly normal strength/tone [de-identified] : seborrhea keratosis on scalp

## 2020-08-20 NOTE — REVIEW OF SYSTEMS
[Negative] : Psychiatric [Chest Pain] : no chest pain [Palpitations] : no palpitations [Claudication] : no  leg claudication [Lower Ext Edema] : no lower extremity edema [Orthopena] : no orthopnea [Paroxysmal Nocturnal Dyspnea] : no paroxysmal nocturnal dyspnea [Shortness Of Breath] : no shortness of breath [Wheezing] : no wheezing [Cough] : no cough [Dyspnea on Exertion] : not dyspnea on exertion [Abdominal Pain] : no abdominal pain [Nausea] : no nausea [Constipation] : no constipation [Diarrhea] : no diarrhea [Vomiting] : no vomiting [Melena] : no melena [Heartburn] : no heartburn [Dizziness] : no dizziness [Headache] : no headache [Fainting] : no fainting [Confusion] : no confusion [Unsteady Walk] : no ataxia [Memory Loss] : no memory loss

## 2020-08-20 NOTE — ASSESSMENT
[FreeTextEntry1] : 49 yr male with DLD, GERD, H. Pylori Gastritis and Hx of LGIB  here for follow up.\par \par #H pylori Gastritis\par - start Bismuth quadruple therapy for 14 days\par - PPI BID, Flagyl, tetracycline and bismuth \par - stool test 4 weeks after PPI\par \par #GERD\par - no symptoms, PPI\par \par #DLD:\par - last lipid profile on 2/2020, ASCVD risk 3.9%, repeat levels, then evaluate need for lipid lowering agents\par \par #miguel rash: seborrhoic keratosis  \par \par #HCM\par - flu shot in October\par - C-scope 2030: normal colon with internal hemorrhoid, repeat 2030\par - routine labs ordered \par - f/u 3 months to review labs and stool h. pylori \par \par

## 2020-08-20 NOTE — END OF VISIT
[] : Resident [FreeTextEntry3] : I personally discussed this patient with the resident at the time of the visit.  And I was present with the resident during the key portions of the history and exam.  I agree with the assessment and plan as written, unless noted below.\par \par Pt. s/p EGD and colonoscopy by GI.  EGD showed H pylori gastritis, will give Quadruple therapy, pt. to follow GI.  H pylori stool antigen.  Repeat blood work especially pt. has stopped statin in 2/20.  Follow up visit in 3 months.

## 2020-11-30 LAB
25(OH)D3 SERPL-MCNC: 24 NG/ML
BASOPHILS # BLD AUTO: 0.02 K/UL
BASOPHILS NFR BLD AUTO: 0.4 %
CHOLEST SERPL-MCNC: 232 MG/DL
EOSINOPHIL # BLD AUTO: 0.09 K/UL
EOSINOPHIL NFR BLD AUTO: 1.9 %
ESTIMATED AVERAGE GLUCOSE: 117 MG/DL
HBA1C MFR BLD HPLC: 5.7 %
HCT VFR BLD CALC: 44.6 %
HDLC SERPL-MCNC: 52 MG/DL
HGB BLD-MCNC: 14.6 G/DL
IMM GRANULOCYTES NFR BLD AUTO: 0.2 %
LDLC SERPL CALC-MCNC: 153 MG/DL
LYMPHOCYTES # BLD AUTO: 1.6 K/UL
LYMPHOCYTES NFR BLD AUTO: 34.1 %
MAN DIFF?: NORMAL
MCHC RBC-ENTMCNC: 28.9 PG
MCHC RBC-ENTMCNC: 32.7 G/DL
MCV RBC AUTO: 88.3 FL
MONOCYTES # BLD AUTO: 0.28 K/UL
MONOCYTES NFR BLD AUTO: 6 %
NEUTROPHILS # BLD AUTO: 2.69 K/UL
NEUTROPHILS NFR BLD AUTO: 57.4 %
NONHDLC SERPL-MCNC: 180 MG/DL
PLATELET # BLD AUTO: 167 K/UL
RBC # BLD: 5.05 M/UL
RBC # FLD: 12.2 %
TRIGL SERPL-MCNC: 166 MG/DL
WBC # FLD AUTO: 4.69 K/UL

## 2020-12-09 LAB — H PYLORI AG STL QL: NOT DETECTED

## 2020-12-31 LAB — COMPREHENSIVE CARDIOMYOPATHY PANEL: ABNORMAL

## 2021-03-15 ENCOUNTER — APPOINTMENT (OUTPATIENT)
Dept: INTERNAL MEDICINE | Facility: CLINIC | Age: 51
End: 2021-03-15
Payer: COMMERCIAL

## 2021-03-15 ENCOUNTER — OUTPATIENT (OUTPATIENT)
Dept: OUTPATIENT SERVICES | Facility: HOSPITAL | Age: 51
LOS: 1 days | Discharge: HOME | End: 2021-03-15

## 2021-03-15 VITALS
HEART RATE: 62 BPM | DIASTOLIC BLOOD PRESSURE: 75 MMHG | SYSTOLIC BLOOD PRESSURE: 126 MMHG | HEIGHT: 66 IN | TEMPERATURE: 97.3 F | WEIGHT: 182 LBS | BODY MASS INDEX: 29.25 KG/M2 | OXYGEN SATURATION: 98 %

## 2021-03-15 DIAGNOSIS — Z83.3 FAMILY HISTORY OF DIABETES MELLITUS: ICD-10-CM

## 2021-03-15 DIAGNOSIS — Z78.9 OTHER SPECIFIED HEALTH STATUS: ICD-10-CM

## 2021-03-15 DIAGNOSIS — B96.81 GASTRITIS, UNSPECIFIED, W/OUT BLEEDING: ICD-10-CM

## 2021-03-15 DIAGNOSIS — K29.70 GASTRITIS, UNSPECIFIED, W/OUT BLEEDING: ICD-10-CM

## 2021-03-15 PROCEDURE — 99213 OFFICE O/P EST LOW 20 MIN: CPT | Mod: GC

## 2021-03-15 RX ORDER — MULTIVIT-MIN/FOLIC/VIT K/LYCOP 400-300MCG
50 MCG TABLET ORAL
Qty: 30 | Refills: 2 | Status: ACTIVE | COMMUNITY
Start: 1900-01-01 | End: 1900-01-01

## 2021-03-15 NOTE — PHYSICAL EXAM
[No Respiratory Distress] : no respiratory distress  [No Accessory Muscle Use] : no accessory muscle use [Clear to Auscultation] : lungs were clear to auscultation bilaterally [Normal Rate] : normal rate  [Regular Rhythm] : with a regular rhythm [Normal S1, S2] : normal S1 and S2 [No Carotid Bruits] : no carotid bruits [No Abdominal Bruit] : a ~M bruit was not heard ~T in the abdomen [No Varicosities] : no varicosities [Pedal Pulses Present] : the pedal pulses are present [No Edema] : there was no peripheral edema [No Palpable Aorta] : no palpable aorta [No Extremity Clubbing/Cyanosis] : no extremity clubbing/cyanosis [Soft] : abdomen soft [Non Tender] : non-tender [Non-distended] : non-distended [No Masses] : no abdominal mass palpated [No HSM] : no HSM [Normal Bowel Sounds] : normal bowel sounds [No Joint Swelling] : no joint swelling [Grossly Normal Strength/Tone] : grossly normal strength/tone [de-identified] : seborrhea keratosis on scalp

## 2021-03-15 NOTE — REVIEW OF SYSTEMS
[Negative] : Heme/Lymph [Chest Pain] : no chest pain [Palpitations] : no palpitations [Claudication] : no  leg claudication [Lower Ext Edema] : no lower extremity edema [Orthopena] : no orthopnea [Paroxysmal Nocturnal Dyspnea] : no paroxysmal nocturnal dyspnea [Shortness Of Breath] : no shortness of breath [Wheezing] : no wheezing [Cough] : no cough [Dyspnea on Exertion] : not dyspnea on exertion [Abdominal Pain] : no abdominal pain [Nausea] : no nausea [Constipation] : no constipation [Diarrhea] : no diarrhea [Vomiting] : no vomiting [Heartburn] : no heartburn [Melena] : no melena [Headache] : no headache [Dizziness] : no dizziness [Fainting] : no fainting [Confusion] : no confusion [Unsteady Walk] : no ataxia [Memory Loss] : no memory loss

## 2021-03-15 NOTE — HISTORY OF PRESENT ILLNESS
[de-identified] : Mr. Tl De Anda a 50 yr male with PMHx of DLD, GERD, H. Pylori Gastritis and Hx of LGIB  ere for follow up of routine labs. \par Patient reports he has has been doing fine with no complaints. \par  [FreeTextEntry1] : annual and labs check

## 2021-03-15 NOTE — END OF VISIT
[] : Resident [FreeTextEntry3] : Pt. here for follow up blood work.  , triglyceride 166, H pylori stool Ag (-), A1C 5.7, 25-OH Vitamin D 24.  Advised pt. to continue vitamin D supplement.  To continue current diet low fat/chol. high fiber ADA diet.  RTC in 6 months or prn with blood work prior to next visit

## 2021-03-15 NOTE — ASSESSMENT
[FreeTextEntry1] : 49 yr male with DLD, GERD, H. Pylori Gastritis and Hx of LGIB (resolved) here for follow up.\par \par #H pylori Gastritis (treated and eradicated) \par - started on Bismuth quadruple therapy for 14 days (completed)\par - PPI BID, Flagyl, tetracycline and bismuth (completed)\par - stool test 4 weeks after PPI --> repeat H. 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

## 2021-03-17 DIAGNOSIS — K29.70 GASTRITIS, UNSPECIFIED, WITHOUT BLEEDING: ICD-10-CM

## 2021-03-17 DIAGNOSIS — K21.9 GASTRO-ESOPHAGEAL REFLUX DISEASE WITHOUT ESOPHAGITIS: ICD-10-CM

## 2021-03-17 DIAGNOSIS — R73.03 PREDIABETES: ICD-10-CM

## 2021-03-17 DIAGNOSIS — E78.5 HYPERLIPIDEMIA, UNSPECIFIED: ICD-10-CM

## 2021-03-17 DIAGNOSIS — E55.9 VITAMIN D DEFICIENCY, UNSPECIFIED: ICD-10-CM

## 2021-03-17 DIAGNOSIS — B35.1 TINEA UNGUIUM: ICD-10-CM

## 2021-06-08 ENCOUNTER — EMERGENCY (EMERGENCY)
Facility: HOSPITAL | Age: 51
LOS: 0 days | Discharge: HOME | End: 2021-06-08
Attending: STUDENT IN AN ORGANIZED HEALTH CARE EDUCATION/TRAINING PROGRAM | Admitting: STUDENT IN AN ORGANIZED HEALTH CARE EDUCATION/TRAINING PROGRAM
Payer: MEDICAID

## 2021-06-08 VITALS
HEIGHT: 66 IN | WEIGHT: 175.05 LBS | HEART RATE: 76 BPM | SYSTOLIC BLOOD PRESSURE: 137 MMHG | OXYGEN SATURATION: 99 % | TEMPERATURE: 97 F | DIASTOLIC BLOOD PRESSURE: 90 MMHG | RESPIRATION RATE: 18 BRPM
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
## 2021-06-08 DIAGNOSIS — M79.89 OTHER SPECIFIED SOFT TISSUE DISORDERS: ICD-10-CM

## 2021-06-08 DIAGNOSIS — M79.674 PAIN IN RIGHT TOE(S): ICD-10-CM
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
## 2021-06-08 DIAGNOSIS — S92.511A DISPLACED FRACTURE OF PROXIMAL PHALANX OF RIGHT LESSER TOE(S), INITIAL ENCOUNTER FOR CLOSED FRACTURE: ICD-10-CM

## 2021-06-08 PROCEDURE — 73660 X-RAY EXAM OF TOE(S): CPT | Mod: 26,RT

## 2021-06-08 PROCEDURE — 99284 EMERGENCY DEPT VISIT MOD MDM: CPT

## 2021-06-08 RX ORDER — IBUPROFEN 200 MG
800 TABLET ORAL ONCE
Refills: 0 | Status: COMPLETED | OUTPATIENT
Start: 2021-06-08 | End: 2021-06-08

## 2021-06-08 RX ADMIN — Medication 800 MILLIGRAM(S): at 09:03

## 2021-06-08 NOTE — ED PROVIDER NOTE - NS ED ROS FT
Constitutional: No fevers.   Eyes:  No visual changes, eye pain or discharge.  MS:  +right pinky toe injury.   Neuro:  No numbness/tingling.   Skin:  No skin rash.   Endocrine: No history of thyroid disease or diabetes.

## 2021-06-08 NOTE — ED PROVIDER NOTE - OBJECTIVE STATEMENT
Patient is a 49 yo M w/ no pmh p/w toe injury. patient stubbed his right pinky toe on corner of furniture 3 days prior, now presenting with pain with ambulation, and mild swelling. pain is constant, moderate, ache, nonradiating, has not taken any meds, worse with ambulation. no other injuries. no numbness/tingling.

## 2021-06-08 NOTE — ED PROVIDER NOTE - PATIENT PORTAL LINK FT
You can access the FollowMyHealth Patient Portal offered by [facility] by registering at the following website: [email or web address]. By joining [employer]’s FollowMyHealth portal, you will also be able to view your health information using other applications (apps) compatible with our system.

## 2021-06-08 NOTE — ED PROVIDER NOTE - CLINICAL SUMMARY MEDICAL DECISION MAKING FREE TEXT BOX
50 year old male no pmh presents here c/o right 5th toe pain x 3 days. Patient states he stubbed his toe against furniture 3 days ago. Patient has been ambulating but has been experiencing pain which prompted his visit. Pain is 8/10 non radiating no other injuries.  Vs reviewed. Pain medication given. Xray obtained + fx. Patient placed in hard sole shoe. Patient a spoken to in detail about results  All questions addressed.  Results of ED work up discussed  Return precautions given. Patient to follow up with podiatry.

## 2021-06-08 NOTE — ED PROCEDURE NOTE - ATTENDING CONTRIBUTION TO CARE
I was present for and supervised the key and critical aspects of the procedures performed during the care of the patient. WDL

## 2021-06-08 NOTE — ED PROVIDER NOTE - PHYSICAL EXAMINATION
CONSTITUTIONAL: Well-developed; well-nourished; in no acute distress.   SKIN: warm, dry.  HEAD: Normocephalic; atraumatic.  EXT: right fifth toe is edematous, tender. pulse 2+ to RLE; no surrounding erythema. no tenderness to base of fifth metatarsal.   NEURO: Alert, oriented, grossly unremarkable sensation to touch intact.   PSYCH: Cooperative, appropriate.

## 2021-06-08 NOTE — ED PROVIDER NOTE - NSFOLLOWUPCLINICS_GEN_ALL_ED_FT
[facility]  Podiatry  .  [place]   Phone: [phone]  Fax:   Follow Up Time: 1-3 Days

## 2021-06-08 NOTE — ED PROVIDER NOTE - ATTENDING CONTRIBUTION TO CARE
I personally evaluated the patient. I reviewed the Resident’s or Physician Assistant’s note (as assigned above), and agree with the findings and plan except as documented in my note.  50 year old male no pmh presents here c/o right 5th toe pain x 3 days. Patient states he stubbed his toe against furniture 3 days ago. Patient has been ambulating but has been experiencing pain which prompted his visit. Pain is 8/10 non radiating no other injuries.   On exam  CONSTITUTIONAL: WA / WN / NAD  HEAD: NCAT  EYES: PERRL; EOMI;   MSK/EXT: No gross deformities; full range of motion. + TTP right 5th toe. Distal pulses in tact.   SKIN: Warm and dry;   NEURO: AAOx3  PSYCH: Memory Intact, Normal Affect

## 2021-06-08 NOTE — ED PROVIDER NOTE - WR ORDER STATUS 1
Received call from [address] [place] [address] regarding results. Normal except for mild elevation in d-dimer. Provided written results to ordering provider.    Adela Valles MD
Performed

## 2021-06-09 ENCOUNTER — NON-APPOINTMENT (OUTPATIENT)
Age: 51
End: 2021-06-09

## 2021-06-11 ENCOUNTER — APPOINTMENT (OUTPATIENT)
Dept: PODIATRY | Facility: CLINIC | Age: 51
End: 2021-06-11
Payer: MEDICAID

## 2021-06-11 ENCOUNTER — OUTPATIENT (OUTPATIENT)
Dept: OUTPATIENT SERVICES | Facility: HOSPITAL | Age: 51
LOS: 1 days | Discharge: HOME | End: 2021-06-11

## 2021-06-11 DIAGNOSIS — S92.901A UNSPECIFIED FRACTURE OF RIGHT FOOT, INITIAL ENCOUNTER FOR CLOSED FRACTURE: ICD-10-CM

## 2021-06-11 DIAGNOSIS — S92.911A UNSPECIFIED FRACTURE OF RIGHT TOE(S), INITIAL ENCOUNTER FOR CLOSED FRACTURE: ICD-10-CM

## 2021-06-11 PROCEDURE — 99203 OFFICE O/P NEW LOW 30 MIN: CPT | Mod: NC

## 2021-06-22 PROBLEM — S92.911A TOE FRACTURE, RIGHT: Status: ACTIVE | Noted: 2021-06-22

## 2021-06-22 NOTE — PHYSICAL EXAM
[General Appearance - Alert] : alert [General Appearance - In No Acute Distress] : in no acute distress [General Appearance - Well Nourished] : well nourished [General Appearance - Well Developed] : well developed [Ankle Swelling (On Exam)] : present [Ankle Swelling On The Right] : of the right ankle [2+] : left foot dorsalis pedis 2+ [No Joint Swelling] : no joint swelling [Normal Foot/Ankle] : Both lower extremities were exposed and visualized. Standing exam demonstrates normal foot posture and alignment. Hindfoot exam shows no hindfoot valgus or varus [Varicose Veins Of Lower Extremities] : not present [] : not present [de-identified] : Mild pain on palpation at the base of the 5th proximal phalanx

## 2021-06-22 NOTE — PROCEDURE
[FreeTextEntry1] : Pt. seen and evaluated\par Discussed treatment and condition w/ patient\par Reviewed Xrays:indicates displaced fx at the base of the proximal phalanx\par Rx CAM boot\par Rx hepatic function test\par Rx Xrays\par Pt. to RTC 1 month;will review second set of Xrays

## 2021-06-22 NOTE — HISTORY OF PRESENT ILLNESS
[FreeTextEntry1] : Pt. is a 50 year old male who presents to clinic with a chief complaint of R. foot metatarsal fracture of the 5th digit. Pt. states that he stubbed his toe a few days ago. Pt. denies any other pedal complaints at this time.

## 2021-06-22 NOTE — PHYSICAL EXAM
[General Appearance - Alert] : alert [General Appearance - In No Acute Distress] : in no acute distress [General Appearance - Well Nourished] : well nourished [General Appearance - Well Developed] : well developed [Ankle Swelling (On Exam)] : present [Ankle Swelling On The Right] : of the right ankle [2+] : left foot dorsalis pedis 2+ [No Joint Swelling] : no joint swelling [Normal Foot/Ankle] : Both lower extremities were exposed and visualized. Standing exam demonstrates normal foot posture and alignment. Hindfoot exam shows no hindfoot valgus or varus [Varicose Veins Of Lower Extremities] : not present [] : not present [de-identified] : Mild pain on palpation at the base of the 5th proximal phalanx

## 2021-06-25 ENCOUNTER — APPOINTMENT (OUTPATIENT)
Dept: PODIATRY | Facility: CLINIC | Age: 51
End: 2021-06-25

## 2021-09-15 ENCOUNTER — APPOINTMENT (OUTPATIENT)
Dept: INTERNAL MEDICINE | Facility: CLINIC | Age: 51
End: 2021-09-15

## 2021-09-20 ENCOUNTER — APPOINTMENT (OUTPATIENT)
Dept: INTERNAL MEDICINE | Facility: CLINIC | Age: 51
End: 2021-09-20
Payer: MEDICAID

## 2021-09-20 ENCOUNTER — NON-APPOINTMENT (OUTPATIENT)
Age: 51
End: 2021-09-20

## 2021-09-20 ENCOUNTER — OUTPATIENT (OUTPATIENT)
Dept: OUTPATIENT SERVICES | Facility: HOSPITAL | Age: 51
LOS: 1 days | Discharge: HOME | End: 2021-09-20

## 2021-09-20 VITALS
WEIGHT: 189 LBS | BODY MASS INDEX: 30.37 KG/M2 | TEMPERATURE: 97 F | HEIGHT: 66 IN | SYSTOLIC BLOOD PRESSURE: 131 MMHG | HEART RATE: 74 BPM | DIASTOLIC BLOOD PRESSURE: 88 MMHG | OXYGEN SATURATION: 97 %

## 2021-09-20 DIAGNOSIS — Z00.00 ENCOUNTER FOR GENERAL ADULT MEDICAL EXAMINATION W/OUT ABNORMAL FINDINGS: ICD-10-CM

## 2021-09-20 PROCEDURE — 99213 OFFICE O/P EST LOW 20 MIN: CPT | Mod: GC

## 2021-09-20 NOTE — ASSESSMENT
[FreeTextEntry1] : \par 49 yr male with DLD, GERD, H. Pylori Gastritis and Hx of LGIB (resolved) here for follow up.\par \par #H pylori Gastritis (treated and eradicated) \par - started on Bismuth quadruple therapy for 14 days (completed)\par - PPI BID, Flagyl, tetracycline and bismuth (completed)\par - stool test 4 weeks after PPI --> repeat H. Pylori stool Ag negative 11/2020\par \par #GERD\par - no symptoms, PPI PRN for bloating \par \par #DLD\par - ASCVD risk 2.6% from 2020\par - lipid profile on 11/2020 elevated: Triglyceride 166, Cholesterol 232, \par - Advised on diet and exercise modification\par \par #Prediabetes\par - A1C 11/2020: 5.7\par - advised on diet modification and exercise \par - repeat ordered\par \par #Vitamin D deficiency\par - Last lab value 24 (11/2020)\par \par #onychomycosis of multiple toes\par -f/u with podiatrist \par - awaiting LFTs for appropriate choice of tx\par \par #Seborrheic keratosis\par - multiple lesions noted on scalp\par - derm referral provided\par \par #HCM\par - flu shot denied\par - C-scope 2030: normal colon with internal hemorrhoid, repeat 2030\par - f/u routine labs \par - f/u in 6 months/prn. \par

## 2021-09-20 NOTE — HISTORY OF PRESENT ILLNESS
[FreeTextEntry1] : Follow-up [de-identified] : 49 y/o male with PMHx of DLD, GERD, H. Pylori Gastritis presenting to the hospital for routine follow-up. Pt notes multiple dark colored raised lesions on his scalp of varying times of onset ranging from months to yrs. Pt denies itchiness, pain, bleeding.

## 2021-09-20 NOTE — PHYSICAL EXAM
[No Acute Distress] : no acute distress [No Respiratory Distress] : no respiratory distress  [No Accessory Muscle Use] : no accessory muscle use [Clear to Auscultation] : lungs were clear to auscultation bilaterally [Normal Rate] : normal rate  [de-identified] : multiple dark raised lesions on scalp

## 2021-09-20 NOTE — END OF VISIT
[] : Resident [FreeTextEntry3] : Here for follow up.  Did not complete blood work ordered.  Pt. had hyperpigmented lesion on left temporal area which pt. reports has increase in size, will refer to dermatology.  Medication renewed.  Declined flu vaccine.  Repeat colonoscopy 2030, and RTC 6 months or prn\par

## 2021-09-26 DIAGNOSIS — K21.9 GASTRO-ESOPHAGEAL REFLUX DISEASE WITHOUT ESOPHAGITIS: ICD-10-CM

## 2021-09-26 DIAGNOSIS — Z00.00 ENCOUNTER FOR GENERAL ADULT MEDICAL EXAMINATION WITHOUT ABNORMAL FINDINGS: ICD-10-CM

## 2021-09-26 DIAGNOSIS — R73.03 PREDIABETES: ICD-10-CM

## 2021-09-29 NOTE — ASSESSMENT
[FreeTextEntry1] : HCM\par a.  follow up GI to scheduled colonoscopy [facility] Counseling:  I discussed with the patient the risks of ustekinumab including but not limited to immunosuppression, malignancy, posterior leukoencephalopathy syndrome, and serious infections.  The patient understands that monitoring is required including a PPD at baseline and must alert us or the primary physician if symptoms of infection or other concerning signs are noted.

## 2022-02-15 ENCOUNTER — APPOINTMENT (OUTPATIENT)
Dept: DERMATOLOGY | Facility: CLINIC | Age: 52
End: 2022-02-15

## 2022-06-14 NOTE — ASU PREOP CHECKLIST - SURGICAL CONSENT
done Terbinafine Pregnancy And Lactation Text: This medication is Pregnancy Category B and is considered safe during pregnancy. It is also excreted in breast milk and breast feeding isn't recommended.

## 2022-08-04 ENCOUNTER — NON-APPOINTMENT (OUTPATIENT)
Age: 52
End: 2022-08-04

## 2022-08-04 ENCOUNTER — OUTPATIENT (OUTPATIENT)
Dept: OUTPATIENT SERVICES | Facility: HOSPITAL | Age: 52
LOS: 1 days | Discharge: HOME | End: 2022-08-04

## 2022-08-04 ENCOUNTER — APPOINTMENT (OUTPATIENT)
Dept: INTERNAL MEDICINE | Facility: CLINIC | Age: 52
End: 2022-08-04

## 2022-08-04 VITALS
OXYGEN SATURATION: 96 % | WEIGHT: 183 LBS | BODY MASS INDEX: 29.41 KG/M2 | HEART RATE: 89 BPM | HEIGHT: 66 IN | SYSTOLIC BLOOD PRESSURE: 117 MMHG | DIASTOLIC BLOOD PRESSURE: 78 MMHG | TEMPERATURE: 97.9 F

## 2022-08-04 DIAGNOSIS — E55.9 VITAMIN D DEFICIENCY, UNSPECIFIED: ICD-10-CM

## 2022-08-04 DIAGNOSIS — E78.5 HYPERLIPIDEMIA, UNSPECIFIED: ICD-10-CM

## 2022-08-04 DIAGNOSIS — E66.3 OVERWEIGHT: ICD-10-CM

## 2022-08-04 DIAGNOSIS — L98.9 DISORDER OF THE SKIN AND SUBCUTANEOUS TISSUE, UNSPECIFIED: ICD-10-CM

## 2022-08-04 DIAGNOSIS — K21.9 GASTRO-ESOPHAGEAL REFLUX DISEASE W/OUT ESOPHAGITIS: ICD-10-CM

## 2022-08-04 DIAGNOSIS — R73.03 PREDIABETES.: ICD-10-CM

## 2022-08-04 PROCEDURE — 99214 OFFICE O/P EST MOD 30 MIN: CPT | Mod: GC

## 2022-08-04 NOTE — ASSESSMENT
[FreeTextEntry1] : 52yo male with chest pain (seen card. 6/10/19 musculoskeletal), PreDM, hyperlipidemia, GERD/H pylori gastritis (resolved), b/l varicoceles/hydrocele, Vit. D insuff., overweight; last seen 9/20/21 for scalp lesion, referred to derm for evaluation, did not show up for his appointment 2/15/22.  Here for follow up\par Had EGD/colonoscopy done 6/29/20, EGD non-erosive gastritis/duodenitis; colonoscopy showed internal hemorrhoids, GI recommended to repeat in 10 years 2030.  Pt. did not complete blood work ordered 9/2021.\par /78  \par \par #scalp lesion\par - pt. missed his derm appointment; will refer pt. to derm again\par \par #GERD\par - resolved, asymptomatic \par \par #DLD\par - did not complete blood work ordered 9/21\par - low fat/chol. high fiber ADA diet\par - fasting CMP, CBC, lipid profile, A1C, Vitamin D\par \par #Prediabetes\par - advised on diet modification and exercise \par \par #Vitamin D deficiency\par - non compliant with Vitamin D3 supplement\par \par #HCM\par - flu shot denied\par - C-scope 2030: normal colon with internal hemorrhoid, repeat 2030\par - f/u routine labs \par - f/u in 6 months/prn. \par

## 2022-08-04 NOTE — HISTORY OF PRESENT ILLNESS
[FreeTextEntry1] : 50yo male with chest pain (seen card. 6/10/19 musculoskeletal), PreDM, hyperlipidemia, GERD/H pylori gastritis (resolved), b/l varicoceles/hydrocele, Vit. D insuff., overweight; last seen 9/20/21 for scalp lesion, referred to derm for evaluation, did not show up for his appointment 2/15/22.  Here for follow up.  Pt. denies any complaint.  Had EGD/colonoscopy done 6/29/20, EGD non-erosive gastritis/duodenitis; colonoscopy showed internal hemorrhoids, GI recommended to repeat in 10 years 2030.  Pt. did not complete blood work ordered 9/2021.

## 2022-08-04 NOTE — PHYSICAL EXAM
[No Acute Distress] : no acute distress [No Respiratory Distress] : no respiratory distress  [No Accessory Muscle Use] : no accessory muscle use [Clear to Auscultation] : lungs were clear to auscultation bilaterally [Normal Rate] : normal rate  [Normal] : no joint swelling and grossly normal strength and tone [de-identified] : scalp lesion

## 2022-08-04 NOTE — REVIEW OF SYSTEMS
[Fever] : no fever [Night Sweats] : no night sweats [Discharge] : no discharge [Vision Problems] : no vision problems [Earache] : no earache [Nasal Discharge] : no nasal discharge [Chest Pain] : no chest pain [Orthopena] : no orthopnea [Shortness Of Breath] : no shortness of breath [Wheezing] : no wheezing [Abdominal Pain] : no abdominal pain [Vomiting] : no vomiting [Dysuria] : no dysuria [Hematuria] : no hematuria [Negative] : Heme/Lymph [de-identified] : scalp lesion

## 2022-08-05 DIAGNOSIS — K21.9 GASTRO-ESOPHAGEAL REFLUX DISEASE WITHOUT ESOPHAGITIS: ICD-10-CM

## 2022-08-05 DIAGNOSIS — E66.3 OVERWEIGHT: ICD-10-CM

## 2022-08-05 DIAGNOSIS — R73.03 PREDIABETES: ICD-10-CM

## 2022-08-05 DIAGNOSIS — L98.9 DISORDER OF THE SKIN AND SUBCUTANEOUS TISSUE, UNSPECIFIED: ICD-10-CM

## 2022-08-05 DIAGNOSIS — E55.9 VITAMIN D DEFICIENCY, UNSPECIFIED: ICD-10-CM

## 2022-08-05 DIAGNOSIS — E78.5 HYPERLIPIDEMIA, UNSPECIFIED: ICD-10-CM

## 2022-08-12 LAB
ALBUMIN SERPL ELPH-MCNC: 4.6 G/DL
ALP BLD-CCNC: 99 U/L
ALT SERPL-CCNC: 44 U/L
ANION GAP SERPL CALC-SCNC: 12 MMOL/L
AST SERPL-CCNC: 21 U/L
BASOPHILS # BLD AUTO: 0.02 K/UL
BASOPHILS NFR BLD AUTO: 0.4 %
BILIRUB SERPL-MCNC: 0.3 MG/DL
BUN SERPL-MCNC: 21 MG/DL
CALCIUM SERPL-MCNC: 9.1 MG/DL
CHLORIDE SERPL-SCNC: 108 MMOL/L
CHOLEST SERPL-MCNC: 252 MG/DL
CO2 SERPL-SCNC: 24 MMOL/L
CREAT SERPL-MCNC: 0.8 MG/DL
EGFR: 106 ML/MIN/1.73M2
EOSINOPHIL # BLD AUTO: 0.07 K/UL
EOSINOPHIL NFR BLD AUTO: 1.5 %
ESTIMATED AVERAGE GLUCOSE: 111 MG/DL
GLUCOSE SERPL-MCNC: 102 MG/DL
HBA1C MFR BLD HPLC: 5.5 %
HCT VFR BLD CALC: 41.1 %
HDLC SERPL-MCNC: 49 MG/DL
HGB BLD-MCNC: 14.4 G/DL
IMM GRANULOCYTES NFR BLD AUTO: 0.2 %
LDLC SERPL CALC-MCNC: 172 MG/DL
LYMPHOCYTES # BLD AUTO: 1.65 K/UL
LYMPHOCYTES NFR BLD AUTO: 35 %
MAN DIFF?: NORMAL
MCHC RBC-ENTMCNC: 30.4 PG
MCHC RBC-ENTMCNC: 35 G/DL
MCV RBC AUTO: 86.7 FL
MONOCYTES # BLD AUTO: 0.35 K/UL
MONOCYTES NFR BLD AUTO: 7.4 %
NEUTROPHILS # BLD AUTO: 2.61 K/UL
NEUTROPHILS NFR BLD AUTO: 55.5 %
NONHDLC SERPL-MCNC: 203 MG/DL
PLATELET # BLD AUTO: 166 K/UL
POTASSIUM SERPL-SCNC: 4.5 MMOL/L
PROT SERPL-MCNC: 6.7 G/DL
RBC # BLD: 4.74 M/UL
RBC # FLD: 12.4 %
SODIUM SERPL-SCNC: 144 MMOL/L
TRIGL SERPL-MCNC: 154 MG/DL
WBC # FLD AUTO: 4.71 K/UL

## 2022-08-14 LAB — 25(OH)D3 SERPL-MCNC: 28 NG/ML

## 2022-10-04 ENCOUNTER — APPOINTMENT (OUTPATIENT)
Dept: PODIATRY | Facility: CLINIC | Age: 52
End: 2022-10-04

## 2022-10-04 VITALS
SYSTOLIC BLOOD PRESSURE: 130 MMHG | BODY MASS INDEX: 28.93 KG/M2 | DIASTOLIC BLOOD PRESSURE: 88 MMHG | HEART RATE: 68 BPM | HEIGHT: 66 IN | OXYGEN SATURATION: 98 % | TEMPERATURE: 97.8 F | WEIGHT: 180 LBS

## 2022-10-04 DIAGNOSIS — M72.2 PLANTAR FASCIAL FIBROMATOSIS: ICD-10-CM

## 2022-10-04 PROCEDURE — 99213 OFFICE O/P EST LOW 20 MIN: CPT

## 2022-10-04 RX ORDER — TERBINAFINE HYDROCHLORIDE 250 MG/1
250 TABLET ORAL DAILY
Qty: 30 | Refills: 3 | Status: ACTIVE | COMMUNITY
Start: 2022-10-04 | End: 1900-01-01

## 2022-10-04 RX ORDER — MELOXICAM 15 MG/1
15 TABLET ORAL DAILY
Qty: 30 | Refills: 5 | Status: ACTIVE | COMMUNITY
Start: 2022-10-04 | End: 1900-01-01

## 2022-10-05 NOTE — PHYSICAL EXAM
[General Appearance - Alert] : alert [General Appearance - In No Acute Distress] : in no acute distress [General Appearance - Well Nourished] : well nourished [General Appearance - Well Developed] : well developed [Ankle Swelling (On Exam)] : present [Ankle Swelling On The Right] : of the right ankle [Varicose Veins Of Lower Extremities] : not present [2+] : left foot dorsalis pedis 2+ [No Joint Swelling] : no joint swelling [] : normal strength/tone [Normal Foot/Ankle] : Both lower extremities were exposed and visualized. Standing exam demonstrates normal foot posture and alignment. Hindfoot exam shows no hindfoot valgus or varus [de-identified] : Mild pain on palpation at the base of the 5th proximal phalanx

## 2022-11-17 ENCOUNTER — APPOINTMENT (OUTPATIENT)
Dept: INTERNAL MEDICINE | Facility: CLINIC | Age: 52
End: 2022-11-17

## 2022-12-24 NOTE — ED PROVIDER NOTE - NS_EDPROVIDERDISPOUSERTYPE_ED_A_ED
[id number]: || ||00\01||False;[id number]: || ||00\01||False; Attending Attestation (For Attendings USE Only)...

## 2022-12-27 ENCOUNTER — OUTPATIENT (OUTPATIENT)
Dept: OUTPATIENT SERVICES | Facility: HOSPITAL | Age: 52
LOS: 1 days | Discharge: HOME | End: 2022-12-27

## 2022-12-27 ENCOUNTER — APPOINTMENT (OUTPATIENT)
Dept: PODIATRY | Facility: CLINIC | Age: 52
End: 2022-12-27

## 2022-12-27 ENCOUNTER — RESULT REVIEW (OUTPATIENT)
Age: 52
End: 2022-12-27

## 2022-12-27 VITALS
TEMPERATURE: 97.1 F | SYSTOLIC BLOOD PRESSURE: 128 MMHG | HEART RATE: 84 BPM | HEIGHT: 66 IN | DIASTOLIC BLOOD PRESSURE: 64 MMHG | BODY MASS INDEX: 29.93 KG/M2 | OXYGEN SATURATION: 98 % | WEIGHT: 186.25 LBS

## 2022-12-27 DIAGNOSIS — M79.673 PAIN IN UNSPECIFIED FOOT: ICD-10-CM

## 2022-12-27 DIAGNOSIS — M72.2 PLANTAR FASCIAL FIBROMATOSIS: ICD-10-CM

## 2022-12-27 LAB
ALBUMIN SERPL ELPH-MCNC: 4.8 G/DL
ALP BLD-CCNC: 121 U/L
ALT SERPL-CCNC: 34 U/L
AST SERPL-CCNC: 17 U/L
BILIRUB DIRECT SERPL-MCNC: <0.2 MG/DL
BILIRUB INDIRECT SERPL-MCNC: NORMAL MG/DL
BILIRUB SERPL-MCNC: 0.4 MG/DL
PROT SERPL-MCNC: 6.9 G/DL

## 2022-12-27 PROCEDURE — 73630 X-RAY EXAM OF FOOT: CPT | Mod: 26,50

## 2022-12-27 PROCEDURE — 99213 OFFICE O/P EST LOW 20 MIN: CPT

## 2022-12-27 RX ORDER — MELOXICAM 15 MG/1
15 TABLET ORAL DAILY
Qty: 14 | Refills: 2 | Status: ACTIVE | COMMUNITY
Start: 2022-12-27 | End: 1900-01-01

## 2022-12-27 RX ORDER — DICLOFENAC SODIUM 1% 10 MG/G
1 GEL TOPICAL
Qty: 1 | Refills: 0 | Status: ACTIVE | COMMUNITY
Start: 2022-12-27 | End: 1900-01-01

## 2022-12-27 RX ORDER — TERBINAFINE HYDROCHLORIDE 250 MG/1
250 TABLET ORAL DAILY
Qty: 30 | Refills: 2 | Status: ACTIVE | COMMUNITY
Start: 2022-12-27 | End: 1900-01-01

## 2022-12-27 NOTE — PHYSICAL EXAM
[General Appearance - Alert] : alert [General Appearance - In No Acute Distress] : in no acute distress [General Appearance - Well Nourished] : well nourished [General Appearance - Well Developed] : well developed [Ankle Swelling (On Exam)] : present [Ankle Swelling On The Right] : of the right ankle [Varicose Veins Of Lower Extremities] : not present [2+] : left foot dorsalis pedis 2+ [No Joint Swelling] : no joint swelling [] : normal strength/tone [Normal Foot/Ankle] : Both lower extremities were exposed and visualized. Standing exam demonstrates normal foot posture and alignment. Hindfoot exam shows no hindfoot valgus or varus [de-identified] : Mild pain on palpation at the base of the 5th proximal phalanx

## 2022-12-27 NOTE — PROCEDURE
[FreeTextEntry1] : Pt. seen and evaluated\par Discussed treatment and condition w/ patient\par RX AS BELOW \par RX XRAYS \par FOLLOW UP IN 2 WEEKS

## 2023-01-10 ENCOUNTER — APPOINTMENT (OUTPATIENT)
Dept: PODIATRY | Facility: CLINIC | Age: 53
End: 2023-01-10
Payer: MEDICAID

## 2023-01-10 VITALS
WEIGHT: 186 LBS | OXYGEN SATURATION: 98 % | DIASTOLIC BLOOD PRESSURE: 78 MMHG | BODY MASS INDEX: 29.89 KG/M2 | HEART RATE: 69 BPM | TEMPERATURE: 97 F | HEIGHT: 66 IN | SYSTOLIC BLOOD PRESSURE: 118 MMHG

## 2023-01-10 PROCEDURE — 99213 OFFICE O/P EST LOW 20 MIN: CPT

## 2023-01-10 RX ORDER — PREDNISONE 10 MG/1
10 TABLET ORAL
Qty: 1 | Refills: 0 | Status: ACTIVE | COMMUNITY
Start: 2023-01-10 | End: 1900-01-01

## 2023-01-12 NOTE — PROCEDURE
[FreeTextEntry1] : \par ACC: [id number] EXAM: XR FOOT COMP MIN 3 VIEWS BI\par \par PROCEDURE DATE: 12/27/2022\par \par \par \par INTERPRETATION: Clinical History / Reason for exam: Bilateral plantar fascial pain and left foot first MTP pain\par \par 6 views of the bilateral feet.\par \par COMPARISON: Right fifth toe radiographs June 8, 2021\par \par Findings/\par impression:\par \par Right:\par \par Healed fracture deformity of the fifth proximal phalanx. No acute fracture or dislocation. Bipartite tibial sesamoid. Plantar calcaneal heel spur. No ankle joint effusion. 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 Nonvisualization of the tibial sesamoid. Mild osteoarthritis of the first interphalangeal joint and first MTP, and talonavicular joint. Plantar calcaneal heel spur. No ankle joint effusion.\par \par Patient examined, history and chart reviewed\par Discussed Radiologist findings with Patient in Detail \par Patient made aware of all conditions and is in agreement with follow up treatment plan as above report states \par Rx prednisone for PF and also PT for PF pain \par If pain continues will follow up with serial injections \par \par \par

## 2023-01-12 NOTE — PHYSICAL EXAM
[General Appearance - Alert] : alert [General Appearance - In No Acute Distress] : in no acute distress [General Appearance - Well Nourished] : well nourished [General Appearance - Well Developed] : well developed [Ankle Swelling (On Exam)] : present [Ankle Swelling On The Right] : of the right ankle [Varicose Veins Of Lower Extremities] : not present [2+] : left foot dorsalis pedis 2+ [No Joint Swelling] : no joint swelling [] : normal strength/tone [Normal Foot/Ankle] : Both lower extremities were exposed and visualized. Standing exam demonstrates normal foot posture and alignment. Hindfoot exam shows no hindfoot valgus or varus [de-identified] : Mild pain on palpation at the base of the 5th proximal phalanx

## 2023-01-12 NOTE — PHYSICAL EXAM
[General Appearance - Alert] : alert [General Appearance - In No Acute Distress] : in no acute distress [General Appearance - Well Nourished] : well nourished [General Appearance - Well Developed] : well developed [Ankle Swelling (On Exam)] : present [Ankle Swelling On The Right] : of the right ankle [Varicose Veins Of Lower Extremities] : not present [2+] : left foot dorsalis pedis 2+ [No Joint Swelling] : no joint swelling [] : normal strength/tone [Normal Foot/Ankle] : Both lower extremities were exposed and visualized. Standing exam demonstrates normal foot posture and alignment. Hindfoot exam shows no hindfoot valgus or varus [de-identified] : Mild pain on palpation at the base of the 5th proximal phalanx

## 2023-01-12 NOTE — PROCEDURE
[FreeTextEntry1] : \par ACC: [id number] EXAM: XR FOOT COMP MIN 3 VIEWS BI\par \par PROCEDURE DATE: 12/27/2022\par \par \par \par INTERPRETATION: Clinical History / Reason for exam: Bilateral plantar fascial pain and left foot first MTP pain\par \par 6 views of the bilateral feet.\par \par COMPARISON: Right fifth toe radiographs June 8, 2021\par \par Findings/\par impression:\par \par Right:\par \par Healed fracture deformity of the fifth proximal phalanx. No acute fracture or dislocation. Bipartite tibial sesamoid. Plantar calcaneal heel spur. No ankle joint effusion. 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 Nonvisualization of the tibial sesamoid. Mild osteoarthritis of the first interphalangeal joint and first MTP, and talonavicular joint. Plantar calcaneal heel spur. No ankle joint effusion.\par \par Patient examined, history and chart reviewed\par Discussed Radiologist findings with Patient in Detail \par Patient made aware of all conditions and is in agreement with follow up treatment plan as above report states \par Rx prednisone for PF and also PT for PF pain \par If pain continues will follow up with serial injections \par \par \par

## 2023-01-24 ENCOUNTER — OUTPATIENT (OUTPATIENT)
Dept: OUTPATIENT SERVICES | Facility: HOSPITAL | Age: 53
LOS: 1 days | Discharge: HOME | End: 2023-01-24

## 2023-01-24 ENCOUNTER — APPOINTMENT (OUTPATIENT)
Dept: DERMATOLOGY | Facility: CLINIC | Age: 53
End: 2023-01-24
Payer: MEDICAID

## 2023-01-24 DIAGNOSIS — L57.0 ACTINIC KERATOSIS: ICD-10-CM

## 2023-01-24 DIAGNOSIS — L91.8 OTHER HYPERTROPHIC DISORDERS OF THE SKIN: ICD-10-CM

## 2023-01-24 PROCEDURE — 99202 OFFICE O/P NEW SF 15 MIN: CPT | Mod: GC

## 2023-01-24 NOTE — HISTORY OF PRESENT ILLNESS
[FreeTextEntry1] : skin lesions [de-identified] : 52 year old presenting for scalp and neck lesions. \par On the scalp, he has 2 hyperpigmented slightly elevated lesions (4mm and 2 mm in size), The bigger one has been there for years but recently grown in size. No bleeding, non-painful, nonpruritic. He is presenting for concern of malignancy. \par Also has skin tags on left and right neck and 1 hyperpigmented raised lesion 1mm on left neck.

## 2023-01-24 NOTE — END OF VISIT
[] : Resident [FreeTextEntry3] : Seborrheic keratoses on scalp and left neck.\par Also skin tags on neck, 3 left side frozen with liquid nitrogen.\par One SK left neck frozen also, 3mm size.

## 2023-01-24 NOTE — PHYSICAL EXAM
[Scalp] : Scalp [Neck] : Neck [FreeTextEntry3] : Seborrheic keratosis on scalp x 2, hyperpigmented flat lesions. well defined borders \par 3-4 skin tags on left side of the neck, smaller skin tags on right side of the neck\par 1 well defined hyperpigmented lesion on left neck that also looks like seborrheic keratosis

## 2023-01-24 NOTE — ASSESSMENT
[FreeTextEntry1] : 52 year old presenting for few skin lesions with personal concern for malignancy. On exam found to have 3 seborrheic keratosis (2 on scalp and 1 on left side of the neck). Also have skin tags on neck.\par \par - informed that lesions are benign but should be monitored for change in shape, itchiness, pain, bleeding\par - left neck skin tags were treated with liquid nitrogen and patient informed that the lesions will fall off in 1-2 weeks.

## 2023-02-07 ENCOUNTER — APPOINTMENT (OUTPATIENT)
Dept: PODIATRY | Facility: CLINIC | Age: 53
End: 2023-02-07

## 2023-02-21 ENCOUNTER — APPOINTMENT (OUTPATIENT)
Dept: PODIATRY | Facility: CLINIC | Age: 53
End: 2023-02-21
Payer: MEDICAID

## 2023-02-21 PROCEDURE — 99213 OFFICE O/P EST LOW 20 MIN: CPT | Mod: 25

## 2023-02-21 PROCEDURE — 20551 NJX 1 TENDON ORIGIN/INSJ: CPT

## 2023-02-21 RX ORDER — TERBINAFINE HYDROCHLORIDE 250 MG/1
250 TABLET ORAL DAILY
Qty: 30 | Refills: 2 | Status: ACTIVE | COMMUNITY
Start: 2023-02-21 | End: 1900-01-01

## 2023-03-01 NOTE — ASSESSMENT
[FreeTextEntry1] : Patient prescribed terbinafine\par \par Discussed with patient possible complication of using oral terbinafine\par Patient aware of side effects\par Patient given rx for LFT \par \par

## 2023-03-01 NOTE — PROCEDURE
[FreeTextEntry1] : \par ACC: [id number] EXAM: XR FOOT COMP MIN 3 VIEWS BI\par \par PROCEDURE DATE: 12/27/2022\par \par \par \par INTERPRETATION: Clinical History / Reason for exam: Bilateral plantar fascial pain and left foot first MTP pain\par \par 6 views of the bilateral feet.\par \par COMPARISON: Right fifth toe radiographs June 8, 2021\par \par Findings/\par impression:\par \par Right:\par \par Healed fracture deformity of the fifth proximal phalanx. No acute fracture or dislocation. Bipartite tibial sesamoid. Plantar calcaneal heel spur. No ankle joint effusion. 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 Nonvisualization of the tibial sesamoid. Mild osteoarthritis of the first interphalangeal joint and first MTP, and talonavicular joint. Plantar calcaneal heel spur. No ankle joint effusion.\par \par Patient examined, history and chart reviewed\par Discussed Radiologist findings with Patient in Detail \par Injection of bilateral heels\par \par injection performed with 1% lidocaine and Betamethasone for a total of 1.5cc. Injection performed under sterile technique \par As above\par \par \par \par  [Left Foot] : on the left foot

## 2023-03-01 NOTE — PHYSICAL EXAM
[General Appearance - Alert] : alert [General Appearance - In No Acute Distress] : in no acute distress [General Appearance - Well Nourished] : well nourished [General Appearance - Well Developed] : well developed [Ankle Swelling (On Exam)] : present [Ankle Swelling On The Right] : of the right ankle [Varicose Veins Of Lower Extremities] : not present [2+] : left foot dorsalis pedis 2+ [No Joint Swelling] : no joint swelling [] : normal strength/tone [Normal Foot/Ankle] : Both lower extremities were exposed and visualized. Standing exam demonstrates normal foot posture and alignment. Hindfoot exam shows no hindfoot valgus or varus [de-identified] : Mild pain on palpation at the base of the 5th proximal phalanx

## 2023-08-28 ENCOUNTER — APPOINTMENT (OUTPATIENT)
Dept: PODIATRY | Facility: CLINIC | Age: 53
End: 2023-08-28
Payer: MEDICAID

## 2023-08-28 DIAGNOSIS — B35.1 TINEA UNGUIUM: ICD-10-CM

## 2023-08-28 PROCEDURE — 99213 OFFICE O/P EST LOW 20 MIN: CPT

## 2023-09-27 PROBLEM — B35.1 ONYCHOMYCOSIS OF TOENAIL: Status: ACTIVE | Noted: 2021-03-15

## 2025-04-28 NOTE — PROCEDURE
[FreeTextEntry1] : Pt. seen and evaluated\par Discussed treatment and condition w/ patient\par PF rx Meloxicam \par Rx hepatic function test\par to RTC 1 month Render Risk Assessment In Note?: no Detail Level: Zone Recommendation Override: Fish oil supplements Recommendation Preamble: The following recommendations were made during the visit: